# Patient Record
Sex: FEMALE | Race: WHITE | Employment: FULL TIME | URBAN - METROPOLITAN AREA
[De-identification: names, ages, dates, MRNs, and addresses within clinical notes are randomized per-mention and may not be internally consistent; named-entity substitution may affect disease eponyms.]

---

## 2024-06-29 ENCOUNTER — OFFICE VISIT (OUTPATIENT)
Dept: URGENT CARE | Facility: CLINIC | Age: 44
End: 2024-06-29
Payer: COMMERCIAL

## 2024-06-29 VITALS
DIASTOLIC BLOOD PRESSURE: 87 MMHG | HEART RATE: 72 BPM | RESPIRATION RATE: 16 BRPM | OXYGEN SATURATION: 99 % | WEIGHT: 170 LBS | SYSTOLIC BLOOD PRESSURE: 141 MMHG | TEMPERATURE: 96.6 F | BODY MASS INDEX: 29.02 KG/M2 | HEIGHT: 64 IN

## 2024-06-29 DIAGNOSIS — J06.9 VIRAL URI WITH COUGH: Primary | ICD-10-CM

## 2024-06-29 PROCEDURE — 99204 OFFICE O/P NEW MOD 45 MIN: CPT | Performed by: PHYSICIAN ASSISTANT

## 2024-06-29 RX ORDER — BENZONATATE 100 MG/1
100 CAPSULE ORAL 3 TIMES DAILY PRN
Qty: 20 CAPSULE | Refills: 0 | Status: SHIPPED | OUTPATIENT
Start: 2024-06-29

## 2024-06-29 RX ORDER — METHYLPREDNISOLONE 4 MG/1
TABLET ORAL
Qty: 21 TABLET | Refills: 0 | Status: SHIPPED | OUTPATIENT
Start: 2024-06-29

## 2024-06-29 NOTE — PROGRESS NOTES
[facility]        NAME: Ada Valverde is a 43 y.o. female  : 1980    MRN: [id number]  DATE: 2024  TIME: 9:43 AM    Assessment and Plan   Viral URI with cough [J06.9]  1. Viral URI with cough  methylPREDNISolone 4 MG tablet therapy pack    benzonatate (TESSALON PERLES) 100 mg capsule            Patient Instructions     Patient Instructions   Recommended oral steroid and tessalon pearles to use as needed for dry cough.     Follow up with PCP in 3-5 days.  Proceed to  ER if symptoms worsen.    If tests are performed, our office will contact you with results only if changes need to made to the care plan discussed with you at the visit. You can review your full results on [facility].        Chief Complaint     Chief Complaint   Patient presents with    Cough     For 3 days, seems to be worsening, couldn't sleep last night         History of Present Illness       Cough  This is a new problem. Episode onset: 3 days ago. The problem has been unchanged. The problem occurs every few minutes. Pertinent negatives include no chills, fever, postnasal drip, rhinorrhea or shortness of breath. Exacerbated [place]: [place]. She has tried nothing for the symptoms.       Review of Systems   Review of Systems   Constitutional:  Negative for activity change, appetite change, chills, diaphoresis, fatigue and fever.   HENT:  Negative for congestion, postnasal drip, rhinorrhea, sinus pressure and sinus pain.    Respiratory:  Positive for cough. Negative for shortness of breath.    All other systems reviewed and are negative.        Current Medications       Current Outpatient Medications:     benzonatate (TESSALON PERLES) 100 mg capsule, Take 1 capsule (100 mg total) by mouth 3 (three) times a day as needed for cough, Disp: 20 capsule, Rfl: 0    methylPREDNISolone 4 MG tablet therapy pack, Use as directed on package, Disp: 21 tablet, Rfl: 0    Current Allergies     Allergies as of 2024    (No  "Known Allergies)            The following portions of the patient's history were reviewed and updated as appropriate: allergies, current medications, past family history, past medical history, past social history, past surgical history and problem list.     History reviewed. No pertinent past medical history.    History reviewed. No pertinent surgical history.    History reviewed. No pertinent family history.      Medications have been verified.        Objective   /87   Pulse 72   Temp (!) 96.6 °F (35.9 °C)   Resp 16   Ht 5' 4\" (1.626 m)   Wt 77.1 kg (170 lb)   SpO2 99%   BMI 29.18 kg/m²        Physical Exam     Physical Exam  Vitals and nursing note reviewed.   Constitutional:       Appearance: Normal appearance.   HENT:      Right Ear: Tympanic membrane, ear canal and external ear normal.      Left Ear: Tympanic membrane, ear canal and external ear normal.      Nose: Nose normal.      Mouth/Throat:      Mouth: Mucous membranes are moist.   Eyes:      Pupils: Pupils are equal, round, and reactive to light.   Cardiovascular:      Rate and Rhythm: Normal rate and regular rhythm.   Pulmonary:      Effort: Pulmonary effort is normal.      Breath sounds: Normal breath sounds.   Skin:     General: Skin is warm.      Capillary Refill: Capillary refill takes less than 2 seconds.   Neurological:      General: No focal deficit present.      Mental Status: She is alert and oriented to person, place, and time.   Psychiatric:         Mood and Affect: Mood normal.         Behavior: Behavior normal.                   "

## 2024-06-29 NOTE — PATIENT INSTRUCTIONS
Physical Medicine and Rehabilitation Progress Note  Ned Gomez 68 y o  female MRN: [id number]  Unit/Bed#: -03 Encounter: [id number]    HPI: Ned Gomez is a 68 y o  female who presented to the [facility] [facility] [address] after fall  Patient p/w c/o neck pain as well as bilateral arm pain/numbness  Imaging revealed cervical stenosis most severe at C4-5 with possible cord edema and patient subsequently underwent C4-5 ACDF & C2-T2 PCDF on 7/20 by Dr Asa Gongora  Patient was also found to have B/L nasal bone fractures and L maxillary sinus hemorrhage which was managed conservatively  Post-op course complicated by urinary retention requiring morrow placement  Chief Complaint: cervical stenosis/central cord syndrome     Subjective: patient without complaint currently     ROS: A 10 point ROS was performed; negative except as noted above       Assessment/Plan:      Cervical stenosis of spine  Assessment & Plan  - C6 fx, per XR report of 7/21 "Redemonstrated anterior-inferior C6 vertebral body corner fracture, unchanged in alignment " (seen by neurosx on 7/26 no further intervention recommended)  - cervical stenosis most severe at C4-5 with possible cord edema s/p ACDF C4-5 & PCDF C2-T2 by Dr Asa Gongora on 7/20  - c-spine precautions  - no brace needed per neurosx  - 2 week post-op FU completed by neurosx while patient in rehab unit   - per neurosx protocol FU XR (already e-prescribed in EMR by neurosx team) to be done 2-3 days prior to FU appt of 9/3   - OP FU with neurosx on 9/3    Nasal bone fractures  Assessment & Plan  - B/L nasal bone fractures & L maxillary sinus hemorrhage  - non-op management per OMFS  - sinus precautions   - abx recommended by OMFS however dc'd by trauma surgery in acute care who was primary team per their protocol     CKD (chronic kidney disease)  Assessment & Plan  - Cr currently 0 95  - baseline appears to be 1 0-1 2  - IM monitoring     Anemia  Assessment & Plan  - Hg currently Recommended oral steroid and tessalon pearles to use as needed for dry cough.     Follow up with PCP in 3-5 days.  Proceed to  ER if symptoms worsen.    If tests are performed, our office will contact you with results only if changes need to made to the care plan discussed with you at the visit. You can review your full results on [facility].   increased to 10 4  post-operatively  - IM monitoring     Urinary retention  Assessment & Plan  - s/p morrow removal, PVRs now consistently below 300 cc, and patient is being treated for UTI (started on abx on 8/8 by Dr Clary Tuttle)     Chest pain  Assessment & Plan  - patient complained of brief episode of left sided chest pain while in therapy  - per patient this occurs at home as well and responds to OTC antacid medication however checked EKG to r/o cardiac etiology and EKG was unrevealing (did show mildly elevated QTc at 487, d/w IM who recommended no further GUTIERREZ/intervention at this time for QTc and to avoid QT prolonging medication if possible)   - resolved after given medication for heartburn   - troponin's negative (per IM no further troponins required)    CHF (congestive heart failure) ([facility])  Assessment & Plan  - EF 45-50%  - grade 1 DD  - not on diuretics at home  - IM monitoring volume status       Dyslipidemia  Assessment & Plan  - on home lipitor 20 mg qpm    History of recurrent UTIs  Assessment & Plan  - at home patient was chronically on keflex 250 mg qd for suppression of recurrent UTIs (confrimed with patient's OP pharmacy that patient receives monthly prescriptions for this, last provided by Dr Jose Rafael Valverde of urology) however patient with UA indicative of possible active UTI, UCx confirmed this and patient is on abx appropriate to susceptibilities (started on 8/8 by Dr Clary Tuttle) once patient completes current abx course will d/w IM if patient should resume home suppressive keflex 250 mg qd   - OP FU with Dr Guille Collier  - on home xanax 0 25 mg qhs prn     Vertigo  Assessment & Plan  - chronic, 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  - on home antivert 12 5 mg q8 prn     * GERD (gastroesophageal reflux disease)  Assessment & Plan  - on home protonix 40 mg qd      Scheduled Meds:  Current Facility-Administered Medications   Medication Dose Route Frequency Provider Last Rate    acetaminophen  975 mg Oral Q8H [address] Chencho Rod MD      ALPRAZolam  0 25 mg Oral HS PRN Chencho Rod MD      atorvastatin  20 mg Oral Daily With MD Mateusz      ciprofloxacin  500 mg Oral Q12H [address] Awa Orona DO      heparin (porcine)  5,000 Units Subcutaneous Q8H [address] Chencho Rod MD      lidocaine  2 patch Topical Daily Chencho Rod MD      meclizine  12 5 mg Oral Q8H PRN Chencho Rod MD      melatonin  3 mg Oral HS Chencho Rod MD      methocarbamol  500 mg Oral Q6H PRN Chencho Rod MD      oxyCODONE  2 5 mg Oral Q4H PRN MD Edmundo Garcia oxyCODONE  5 mg Oral Q4H PRN Chencho Rod MD      pantoprazole  40 mg Oral Early Morning Chencho Rod MD      polyethylene glycol  17 g Oral Daily PRN Chencho Rod MD      psyllium  1 packet Oral Daily Chencho Rod MD      simethicone  80 mg Oral 4x Daily (with meals and at bedtime) Penny Deras PA-C      sodium chloride  1 spray Each Nare 4x Daily PRN Chencho Rod MD            Incidental findings:     1) gallstones: asymptomatic; OP FU with PCP with GI referral at PCPs discretion      DVT ppx: HSQ (cleared in acute care by neurosx for pharmacologic DVT ppx)       Objective:    Functional Update:  Mobility: min-mod  Transfers: min   ADLs: min-mod       Physical Exam:    Vitals:    08/10/21 0747   BP: 136/70   Pulse: 84   Resp: 18   Temp: 98 °F (36 7 °C)   SpO2: 96%           General: alert, no apparent distress, cooperative and comfortable  HEENT:  Eye: Normal external eye, conjunctiva, lidsc cornea  Ears: Normal external ears  Nose: Normal external nose, mucus membranes  CARDIAC:  +S1/2  LUNGS:  no abnormal respiratory pattern, no retractions noted, non-labored breathing   ABDOMEN:  soft NT  EXTREMITIES:  no cyanosis or clubbing   NEURO:  awake, alert appropriate responses to questions   PSYCH:  mood/affect currently stable   INCISION:  surgical site C/D/I             Diagnostic Studies:  No orders to display       Laboratory:         Invalid input(s): PLTCT Invalid input(s): CA